# Patient Record
Sex: FEMALE | Race: ASIAN | Employment: UNEMPLOYED | URBAN - METROPOLITAN AREA
[De-identification: names, ages, dates, MRNs, and addresses within clinical notes are randomized per-mention and may not be internally consistent; named-entity substitution may affect disease eponyms.]

---

## 2024-05-29 ENCOUNTER — TELEPHONE (OUTPATIENT)
Dept: FAMILY MEDICINE CLINIC | Age: 49
End: 2024-05-29

## 2024-05-29 NOTE — TELEPHONE ENCOUNTER
LM for patient to call where she had her mammogram done on 03/29/2023 and ask them to fax the results to PCP. According to care everywhere results can not be released without patient consent. Office fax number was left.

## 2024-06-14 ENCOUNTER — TELEPHONE (OUTPATIENT)
Dept: FAMILY MEDICINE CLINIC | Age: 49
End: 2024-06-14

## 2024-06-14 NOTE — TELEPHONE ENCOUNTER
Called patient and lvm for a return call to see if patient is still seeing Dr. Jason, and to find out if patient had a mammogram done and if so where was it done . Call ended

## 2025-02-28 ENCOUNTER — OFFICE VISIT (OUTPATIENT)
Dept: GASTROENTEROLOGY | Age: 50
End: 2025-02-28

## 2025-02-28 VITALS — TEMPERATURE: 98 F | DIASTOLIC BLOOD PRESSURE: 68 MMHG | WEIGHT: 139 LBS | SYSTOLIC BLOOD PRESSURE: 105 MMHG

## 2025-02-28 DIAGNOSIS — K59.09 CHRONIC CONSTIPATION: Primary | ICD-10-CM

## 2025-02-28 DIAGNOSIS — K21.9 GASTROESOPHAGEAL REFLUX DISEASE, UNSPECIFIED WHETHER ESOPHAGITIS PRESENT: ICD-10-CM

## 2025-02-28 DIAGNOSIS — Z86.0100 HISTORY OF COLON POLYPS: ICD-10-CM

## 2025-02-28 DIAGNOSIS — K31.7 GASTRIC POLYPS: ICD-10-CM

## 2025-02-28 PROCEDURE — 99204 OFFICE O/P NEW MOD 45 MIN: CPT | Performed by: INTERNAL MEDICINE

## 2025-02-28 RX ORDER — PANTOPRAZOLE SODIUM 40 MG/1
40 TABLET, DELAYED RELEASE ORAL EVERY MORNING
COMMUNITY

## 2025-02-28 RX ORDER — FAMOTIDINE 40 MG/1
40 TABLET, FILM COATED ORAL DAILY
COMMUNITY

## 2025-02-28 RX ORDER — LINACLOTIDE 290 UG/1
290 CAPSULE, GELATIN COATED ORAL
Qty: 30 CAPSULE | Refills: 3 | Status: SHIPPED | OUTPATIENT
Start: 2025-02-28

## 2025-02-28 RX ORDER — OMEPRAZOLE 20 MG/1
20 CAPSULE, DELAYED RELEASE ORAL
Qty: 180 CAPSULE | Refills: 1 | Status: SHIPPED | OUTPATIENT
Start: 2025-02-28

## 2025-02-28 RX ORDER — FAMOTIDINE 20 MG/1
20 TABLET, FILM COATED ORAL 2 TIMES DAILY
COMMUNITY

## 2025-02-28 ASSESSMENT — ENCOUNTER SYMPTOMS
ANAL BLEEDING: 0
SORE THROAT: 0
SHORTNESS OF BREATH: 0
ABDOMINAL DISTENTION: 0
DIARRHEA: 0
CHOKING: 0
COUGH: 0
TROUBLE SWALLOWING: 0
BLOOD IN STOOL: 0
ABDOMINAL PAIN: 0
CONSTIPATION: 1
VOMITING: 0
WHEEZING: 0
VOICE CHANGE: 0
NAUSEA: 0
RECTAL PAIN: 0

## 2025-02-28 NOTE — PROGRESS NOTES
GI NEW PATIENT OFFICE VISIT    INTERVAL HISTORY:   No referring provider defined for this encounter.    Chief Complaint   Patient presents with    GI Problem     Patient is here today as a NP due to GI issues.       1. Chronic constipation    2. Gastroesophageal reflux disease, unspecified whether esophagitis present    3. Gastric polyps    4. History of colon polyps      This patient seen my office for the first time  She is a pleasant 50-year-old female has history significant for chronic constipation predominant irritable bowel syndrome like symptoms  No overt bleeding or melena    She has history for chronic GERD  Has been on PPI therapy for a long time  She tried to switch it to H2 blockers    It appears that she gets rebound of her symptoms     Had EGD and colonoscopy done at [facility] was reviewed with her this was done in 2023    She was found to have gastric polyps which are fundic gland polyps    No evidence of hiatus hernia or Jackson's esophagus was noted    She was found to have adenomatous colon polyps    No known family history for colon cancer    No significant weight loss    She otherwise eating healthier food and also exercising    Appears to have stress issues    Available records were reviewed    Nonspecific left upper quadrant abdominal pain on off-and-on basis more so in the past and recently but never had imaging studies done in the past      HISTORY OF PRESENT ILLNESS: Ms.Samiya Neri is a 50 y.o. female with a past history remarkable for , referred for evaluation of   Chief Complaint   Patient presents with    GI Problem     Patient is here today as a NP due to GI issues.   .    Past Medical,Family, and Social History reviewed and does contribute to the patient presenting condition.    Patient's PMH/PSH,SH,PSYCH Hx, MEDs, ALLERGIES, and ROS were all reviewed and updated in the appropriate sections.    PAST MEDICAL HISTORY:  Past Medical History:   Diagnosis Date    Heart

## 2025-03-03 DIAGNOSIS — K59.09 CHRONIC CONSTIPATION: ICD-10-CM

## 2025-03-03 DIAGNOSIS — K59.09 CHRONIC CONSTIPATION: Primary | ICD-10-CM

## 2025-03-03 RX ORDER — LINACLOTIDE 290 UG/1
1 CAPSULE, GELATIN COATED ORAL
Refills: 3 | OUTPATIENT
Start: 2025-03-03

## 2025-03-03 NOTE — TELEPHONE ENCOUNTER
Patient left message informing the wrong dose of linzess was sent to the pharmacy. Patient is asking for Linzess 72 mcg be sent, not 290 mcg. Patient contact [phone].